# Patient Record
Sex: FEMALE | Race: BLACK OR AFRICAN AMERICAN
[De-identification: names, ages, dates, MRNs, and addresses within clinical notes are randomized per-mention and may not be internally consistent; named-entity substitution may affect disease eponyms.]

---

## 2019-02-24 ENCOUNTER — HOSPITAL ENCOUNTER (EMERGENCY)
Dept: HOSPITAL 61 - ER | Age: 39
Discharge: HOME | End: 2019-02-24
Payer: COMMERCIAL

## 2019-02-24 VITALS — HEIGHT: 61 IN | WEIGHT: 232.25 LBS | BODY MASS INDEX: 43.85 KG/M2

## 2019-02-24 VITALS — DIASTOLIC BLOOD PRESSURE: 67 MMHG | SYSTOLIC BLOOD PRESSURE: 146 MMHG

## 2019-02-24 DIAGNOSIS — Z88.2: ICD-10-CM

## 2019-02-24 DIAGNOSIS — L30.9: Primary | ICD-10-CM

## 2019-02-24 DIAGNOSIS — Z88.0: ICD-10-CM

## 2019-02-24 PROCEDURE — 99283 EMERGENCY DEPT VISIT LOW MDM: CPT

## 2019-02-24 NOTE — PHYS DOC
Past Medical History


Past Medical History:  Other


Additional Past Medical Histor:  tension headache


Past Surgical History:  Cholecystectomy, Tubal ligation


Alcohol Use:  Occasionally


Drug Use:  None





Adult General


Chief Complaint


Chief Complaint:  SKIN RASH/ABSCESS





[facility], [place]





Patient is a 39  year old female who presents with 2 weeks ago she began having 

patches of red itchy areas on her left arm.





Review of Systems


Review of Systems





Constitutional: Denies fever or chills []


Eyes: Denies change in visual acuity, redness, or eye pain []


HENT: Denies nasal congestion or sore throat []


Respiratory: Denies cough or shortness of breath []


Cardiovascular: No additional information not addressed in HPI []


GI: Denies abdominal pain, nausea, vomiting, bloody stools or diarrhea []


: Denies dysuria or hematuria []


Musculoskeletal: Denies back pain or joint pain []


Integument: Left arm rash or skin lesions []


Neurologic: Denies headache, focal weakness or sensory changes []





All other systems were reviewed and found to be within normal limits, except as 

documented in this note.





Allergies


Allergies





Allergies








Coded Allergies Type Severity Reaction Last Updated Verified


 


  Penicillins Allergy Intermediate hives, itching 2/24/19 Yes


 


  Sulfa (Sulfonamide Antibiotics) Allergy Unknown  4/18/14 Yes











Physical Exam


Physical Exam





Constitutional: Well developed, well nourished, no acute distress, non-toxic 

appearance. []


HENT: Normocephalic, atraumatic, bilateral external ears normal, oropharynx 

moist, no oral exudates, nose normal. []


Eyes: PERRLA, EOMI, conjunctiva normal, no discharge. [] 


Neck: Normal range of motion, no tenderness, supple, no stridor. [] 


Cardiovascular:Heart rate regular rhythm, no murmur []


Lungs & Thorax:  Bilateral breath sounds clear to auscultation []


Abdomen: Bowel sounds normal, soft, no tenderness, no masses, no pulsatile 

masses. [] 


Skin: Warm, dry, left arm erythema, left arm rash. [] 


Back: No tenderness, no CVA tenderness. [] 


Extremities: No tenderness, no cyanosis, no clubbing, ROM intact, no edema. [] 


Neurologic: Alert and oriented X 3, normal motor function, normal sensory 

function, no focal deficits noted. []


Psychologic: Affect normal, judgement normal, mood normal. []





Current Patient Data


Vital Signs





 Vital Signs








  Date Time  Temp Pulse Resp B/P (MAP) Pulse Ox O2 Delivery O2 Flow Rate FiO2


 


2/24/19 11:52 98.3 89 18 146/67 (93) 100 Room Air  





 98.3       











EKG


EKG


[]





Radiology/Procedures


Radiology/Procedures


[]





Course & Med Decision Making


Course & Med Decision Making


Patient is a 39  year old female who presents with 2 weeks ago she began having 

patches of red itchy areas on her left arm. Patient has a raised red dry 

excoriated-looking area to left lower forearm and right upper arm. Patient has 

is nowhere else on her body. Patient likely has atypical otitis. Patient is 

given Triamcinolone cream and is to take benadryl. Patient follow-up with her 

primary care provider. Alert and oriented. Denies SOA or chest pain.





Dragon Disclaimer


Dragon Disclaimer


This electronic medical record was generated, in whole or in part, using a 

voice recognition dictation system.





Departure


Departure


Impression:  


 Primary Impression:  


 Dermatitis


Disposition:  01 HOME, SELF-CARE


Condition:  STABLE


Referrals:  


NO PCP (PCP)


Patient Instructions:  Contact Dermatitis, Eczema





Additional Instructions:  


Follow-up with primary care provider. Use medications as prescribed. Taking 

Benadryl for the itching.


Scripts


Cephalexin (KEFLEX) 500 Mg Capsule


1 CAP PO TID, #21 CAP


   Prov: MIGDALIA BRICENO         2/24/19 


Triamcinolone Acetonide (TRIAMCINOLONE ACETONIDE 0.1% OINT) 15 Gm Oint...g.


1 NATALYA TP TID for WOUND CARE, #1 TUBE


   MIX WITH EUCERIN


   AS DIRECTED BY PHYSICIAN


   Prov: MIGDALIA BRICENO         2/24/19











MIGDALIA BRICENO Feb 24, 2019 12:33

## 2019-09-29 ENCOUNTER — HOSPITAL ENCOUNTER (INPATIENT)
Dept: HOSPITAL 61 - ER | Age: 39
LOS: 3 days | Discharge: HOME | DRG: 580 | End: 2019-10-02
Attending: INTERNAL MEDICINE | Admitting: INTERNAL MEDICINE
Payer: COMMERCIAL

## 2019-09-29 VITALS — DIASTOLIC BLOOD PRESSURE: 71 MMHG | SYSTOLIC BLOOD PRESSURE: 145 MMHG

## 2019-09-29 VITALS — WEIGHT: 233 LBS | BODY MASS INDEX: 43.99 KG/M2 | HEIGHT: 61 IN

## 2019-09-29 VITALS — DIASTOLIC BLOOD PRESSURE: 65 MMHG | SYSTOLIC BLOOD PRESSURE: 132 MMHG

## 2019-09-29 DIAGNOSIS — R65.10: ICD-10-CM

## 2019-09-29 DIAGNOSIS — T63.301A: ICD-10-CM

## 2019-09-29 DIAGNOSIS — E87.6: ICD-10-CM

## 2019-09-29 DIAGNOSIS — L30.9: ICD-10-CM
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
## 2019-09-29 DIAGNOSIS — E66.01: ICD-10-CM

## 2019-09-29 DIAGNOSIS — Z88.2: ICD-10-CM

## 2019-09-29 DIAGNOSIS — L03.115: ICD-10-CM
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
## 2019-09-29 DIAGNOSIS — L02.415: Primary | ICD-10-CM

## 2019-09-29 LAB
ALBUMIN SERPL-MCNC: 3.6 G/DL (ref 3.4–5)
ALBUMIN/GLOB SERPL: 0.8 {RATIO} (ref 1–1.7)
ALP SERPL-CCNC: 73 U/L (ref 46–116)
ALT SERPL-CCNC: 14 U/L (ref 14–59)
ANION GAP SERPL CALC-SCNC: 9 MMOL/L (ref 6–14)
AST SERPL-CCNC: 13 U/L (ref 15–37)
BASOPHILS # BLD AUTO: 0 X10^3/UL (ref 0–0.2)
BASOPHILS NFR BLD: 0 % (ref 0–3)
BILIRUB SERPL-MCNC: 0.4 MG/DL (ref 0.2–1)
BUN SERPL-MCNC: 6 MG/DL (ref 7–20)
BUN/CREAT SERPL: 7 (ref 6–20)
CALCIUM SERPL-MCNC: 9.6 MG/DL (ref 8.5–10.1)
CHLORIDE SERPL-SCNC: 101 MMOL/L (ref 98–107)
CO2 SERPL-SCNC: 29 MMOL/L (ref 21–32)
CREAT SERPL-MCNC: 0.9 MG/DL (ref 0.6–1)
EOSINOPHIL NFR BLD: 0.1 X10^3/UL (ref 0–0.7)
EOSINOPHIL NFR BLD: 1 % (ref 0–3)
ERYTHROCYTE [DISTWIDTH] IN BLOOD BY AUTOMATED COUNT: 13.5 % (ref 11.5–14.5)
GFR SERPLBLD BASED ON 1.73 SQ M-ARVRAT: 83.5 ML/MIN
GLOBULIN SER-MCNC: 4.8 G/DL (ref 2.2–3.8)
GLUCOSE SERPL-MCNC: 99 MG/DL (ref 70–99)
HCT VFR BLD CALC: 34.6 % (ref 36–47)
HGB BLD-MCNC: 12 G/DL (ref 12–15.5)
LYMPHOCYTES # BLD: 2.1 X10^3/UL (ref 1–4.8)
LYMPHOCYTES NFR BLD AUTO: 21 % (ref 24–48)
MCH RBC QN AUTO: 31 PG (ref 25–35)
MCHC RBC AUTO-ENTMCNC: 35 G/DL (ref 31–37)
MCV RBC AUTO: 90 FL (ref 79–100)
MONO #: 0.5 X10^3/UL (ref 0–1.1)
MONOCYTES NFR BLD: 5 % (ref 0–9)
NEUT #: 7.3 X10^3/UL (ref 1.8–7.7)
NEUTROPHILS NFR BLD AUTO: 73 % (ref 31–73)
PLATELET # BLD AUTO: 263 X10^3/UL (ref 140–400)
POTASSIUM SERPL-SCNC: 3.3 MMOL/L (ref 3.5–5.1)
PROT SERPL-MCNC: 8.4 G/DL (ref 6.4–8.2)
RBC # BLD AUTO: 3.86 X10^6/UL (ref 3.5–5.4)
SODIUM SERPL-SCNC: 139 MMOL/L (ref 136–145)
WBC # BLD AUTO: 10 X10^3/UL (ref 4–11)

## 2019-09-29 PROCEDURE — 83605 ASSAY OF LACTIC ACID: CPT

## 2019-09-29 PROCEDURE — 87040 BLOOD CULTURE FOR BACTERIA: CPT

## 2019-09-29 PROCEDURE — A7015 AEROSOL MASK USED W NEBULIZE: HCPCS

## 2019-09-29 PROCEDURE — A4461 SURGICL DRESS HOLD NON-REUSE: HCPCS

## 2019-09-29 PROCEDURE — 90471 IMMUNIZATION ADMIN: CPT

## 2019-09-29 PROCEDURE — 36415 COLL VENOUS BLD VENIPUNCTURE: CPT

## 2019-09-29 PROCEDURE — 87075 CULTR BACTERIA EXCEPT BLOOD: CPT

## 2019-09-29 PROCEDURE — 85025 COMPLETE CBC W/AUTO DIFF WBC: CPT

## 2019-09-29 PROCEDURE — 80053 COMPREHEN METABOLIC PANEL: CPT

## 2019-09-29 PROCEDURE — 73590 X-RAY EXAM OF LOWER LEG: CPT

## 2019-09-29 PROCEDURE — 90714 TD VACC NO PRESV 7 YRS+ IM: CPT

## 2019-09-29 PROCEDURE — 81025 URINE PREGNANCY TEST: CPT

## 2019-09-29 PROCEDURE — 80048 BASIC METABOLIC PNL TOTAL CA: CPT

## 2019-09-29 PROCEDURE — 96365 THER/PROPH/DIAG IV INF INIT: CPT

## 2019-09-29 PROCEDURE — G0378 HOSPITAL OBSERVATION PER HR: HCPCS

## 2019-09-29 PROCEDURE — 87071 CULTURE AEROBIC QUANT OTHER: CPT

## 2019-09-29 PROCEDURE — 76881 US COMPL JOINT R-T W/IMG: CPT

## 2019-09-29 PROCEDURE — 85651 RBC SED RATE NONAUTOMATED: CPT

## 2019-09-29 RX ADMIN — ACETAMINOPHEN PRN MG: 500 TABLET ORAL at 20:12

## 2019-09-29 NOTE — PHYS DOC
Past Medical History


Past Medical History:  Other


Additional Past Medical Histor:  tension headache


Past Surgical History:  Cholecystectomy, Tubal ligation


Alcohol Use:  Occasionally


Drug Use:  None





Adult General


Chief Complaint


Chief Complaint:  INSECT BITE





HPI


HPI





Patient is a 39  year old Female who presents with received an insect bite on 

her right shin about a week ago and went to the doctor on Friday and they gave 

her Keflex. Patient states that the redness is getting worse around the bite and

the bite itself is open and draining clear liquid and has gotten bigger in size.

Patient denies fevers. She rates her pain an 8 out of 10.





Review of Systems


Review of Systems








Integument: Insect bite with cellulitis. Denies rash or skin lesions []





All other systems were reviewed and found to be within normal limits, except as 

documented in this note.





Allergies


Allergies





Allergies








Coded Allergies Type Severity Reaction Last Updated Verified


 


  Penicillins Allergy Intermediate hives, itching 2/24/19 Yes


 


  Sulfa (Sulfonamide Antibiotics) Allergy Unknown  4/18/14 Yes











Physical Exam


Physical Exam





Constitutional: Well developed, well nourished, no acute distress, non-toxic 

appearance. []


Skin: Warm, dry, Right shin erythema, no rash. [] 


Extremities: Right shin tenderness, no cyanosis, no clubbing, ROM intact, 1-2+ 

edema. [] 


Neurologic: Alert and oriented X 3, normal motor function, normal sensory 

function, no focal deficits noted. []


Psychologic: Affect normal, judgement normal, mood normal. []





Current Patient Data


Vital Signs





                                   Vital Signs








  Date Time  Temp Pulse Resp B/P (MAP) Pulse Ox O2 Delivery O2 Flow Rate FiO2


 


9/29/19 15:55 98.5 90 14 138/66 (90) 99 Room Air  





 98.5       








Lab Values





                                Laboratory Tests








Test


 9/29/19


17:00


 


White Blood Count


 10.0 x10^3/uL


(4.0-11.0)


 


Red Blood Count


 3.86 x10^6/uL


(3.50-5.40)


 


Hemoglobin


 12.0 g/dL


(12.0-15.5)


 


Hematocrit


 34.6 %


(36.0-47.0)  L


 


Mean Corpuscular Volume


 90 fL ()





 


Mean Corpuscular Hemoglobin 31 pg (25-35)  


 


Mean Corpuscular Hemoglobin


Concent 35 g/dL


(31-37)


 


Red Cell Distribution Width


 13.5 %


(11.5-14.5)


 


Platelet Count


 263 x10^3/uL


(140-400)


 


Neutrophils (%) (Auto) 73 % (31-73)  


 


Lymphocytes (%) (Auto) 21 % (24-48)  L


 


Monocytes (%) (Auto) 5 % (0-9)  


 


Eosinophils (%) (Auto) 1 % (0-3)  


 


Basophils (%) (Auto) 0 % (0-3)  


 


Neutrophils # (Auto)


 7.3 x10^3/uL


(1.8-7.7)


 


Lymphocytes # (Auto)


 2.1 x10^3/uL


(1.0-4.8)


 


Monocytes # (Auto)


 0.5 x10^3/uL


(0.0-1.1)


 


Eosinophils # (Auto)


 0.1 x10^3/uL


(0.0-0.7)


 


Basophils # (Auto)


 0.0 x10^3/uL


(0.0-0.2)


 


Sodium Level


 139 mmol/L


(136-145)


 


Potassium Level


 3.3 mmol/L


(3.5-5.1)  L


 


Chloride Level


 101 mmol/L


()


 


Carbon Dioxide Level


 29 mmol/L


(21-32)


 


Anion Gap 9 (6-14)  


 


Blood Urea Nitrogen


 6 mg/dL (7-20)


L


 


Creatinine


 0.9 mg/dL


(0.6-1.0)


 


Estimated GFR


(Cockcroft-Gault) 83.5  





 


BUN/Creatinine Ratio 7 (6-20)  


 


Glucose Level


 99 mg/dL


(70-99)


 


Lactic Acid Level


 0.9 mmol/L


(0.4-2.0)


 


Calcium Level


 9.6 mg/dL


(8.5-10.1)


 


Total Bilirubin


 0.4 mg/dL


(0.2-1.0)


 


Aspartate Amino Transferase


(AST) 13 U/L (15-37)


L


 


Alanine Aminotransferase (ALT)


 14 U/L (14-59)





 


Alkaline Phosphatase


 73 U/L


()


 


Total Protein


 8.4 g/dL


(6.4-8.2)  H


 


Albumin


 3.6 g/dL


(3.4-5.0)


 


Albumin/Globulin Ratio


 0.8 (1.0-1.7)


L





                                Laboratory Tests


9/29/19 17:00








                                Laboratory Tests


9/29/19 17:00











EKG


EKG


[]





Radiology/Procedures


Radiology/Procedures


[]





Course & Med Decision Making


Course & Med Decision Making


Patient is a 39  year old Female who presents with received an insect bite on 

her right shin about a week ago and went to the doctor on Friday and they gave 

her Keflex. Patient states that the redness is getting worse around the bite and

 the bite itself is open and draining clear liquid and has gotten bigger in 

size. Patient denies fevers. She rates her pain an 8 out of 10. Alert and 

oriented. Speaks in full clear sentences. Skin pink warm and dry. Patient has a 

right lower shin bite that is quarter sized, elevated, open and white with large

 amount of cellulitis and heat around the bite itself. The whole area itself is 

softball size or slightly larger. Tender to touch. Patient is ambulatory with a 

steady gait.





Blood work is unremarkable. I have talked to Dr. Starr for admission. I asked 

about antibiotics and she stated she would start them as she was already in the 

chart.





Dragon Disclaimer


Dragon Disclaimer


This electronic medical record was generated, in whole or in part, using a voice

 recognition dictation system.





Departure


Departure


Impression:  


   Primary Impression:  


   Cellulitis


Disposition:  09 ADMITTED AS INPATIENT


Admitting Physician:  HIMS


Condition:  STABLE


Referrals:  


NO PCP (PCP)





Problem Qualifiers








   Primary Impression:  


   Cellulitis


   Site of cellulitis:  extremity  Site of cellulitis of extremity:  lower 

   extremity  Laterality:  right  Qualified Codes:  L03.115 - Cellulitis of 

   right lower limb








MIGDALIA BRICENO            Sep 29, 2019 16:35

## 2019-09-29 NOTE — RAD
TIBIA FIBULA RIGHT

 

History: Wound

 

Comparison: None.

 

Findings:

3 views of the right tibia-fibula are submitted. No acute fracture or 

aggressive bone destruction is identified.

 

Impression: 

 

1.  No acute fracture or aggressive bone destruction is identified.

 

Electronically signed by: Jefry Babcock MD (9/29/2019 6:38 PM) [facility]

## 2019-09-30 VITALS — DIASTOLIC BLOOD PRESSURE: 63 MMHG | SYSTOLIC BLOOD PRESSURE: 114 MMHG

## 2019-09-30 VITALS — DIASTOLIC BLOOD PRESSURE: 63 MMHG | SYSTOLIC BLOOD PRESSURE: 122 MMHG

## 2019-09-30 VITALS — DIASTOLIC BLOOD PRESSURE: 69 MMHG | SYSTOLIC BLOOD PRESSURE: 125 MMHG

## 2019-09-30 VITALS — SYSTOLIC BLOOD PRESSURE: 131 MMHG | DIASTOLIC BLOOD PRESSURE: 68 MMHG

## 2019-09-30 VITALS — DIASTOLIC BLOOD PRESSURE: 60 MMHG | SYSTOLIC BLOOD PRESSURE: 112 MMHG

## 2019-09-30 VITALS — SYSTOLIC BLOOD PRESSURE: 129 MMHG | DIASTOLIC BLOOD PRESSURE: 69 MMHG

## 2019-09-30 VITALS — DIASTOLIC BLOOD PRESSURE: 61 MMHG | SYSTOLIC BLOOD PRESSURE: 109 MMHG

## 2019-09-30 VITALS — DIASTOLIC BLOOD PRESSURE: 54 MMHG | SYSTOLIC BLOOD PRESSURE: 108 MMHG

## 2019-09-30 VITALS — DIASTOLIC BLOOD PRESSURE: 71 MMHG | SYSTOLIC BLOOD PRESSURE: 131 MMHG

## 2019-09-30 VITALS — DIASTOLIC BLOOD PRESSURE: 68 MMHG | SYSTOLIC BLOOD PRESSURE: 125 MMHG

## 2019-09-30 VITALS — SYSTOLIC BLOOD PRESSURE: 142 MMHG | DIASTOLIC BLOOD PRESSURE: 73 MMHG

## 2019-09-30 LAB
ANION GAP SERPL CALC-SCNC: 9 MMOL/L (ref 6–14)
BUN SERPL-MCNC: 4 MG/DL (ref 7–20)
CALCIUM SERPL-MCNC: 9 MG/DL (ref 8.5–10.1)
CHLORIDE SERPL-SCNC: 104 MMOL/L (ref 98–107)
CO2 SERPL-SCNC: 28 MMOL/L (ref 21–32)
CREAT SERPL-MCNC: 0.7 MG/DL (ref 0.6–1)
GFR SERPLBLD BASED ON 1.73 SQ M-ARVRAT: 111.6 ML/MIN
GLUCOSE SERPL-MCNC: 100 MG/DL (ref 70–99)
POTASSIUM SERPL-SCNC: 4 MMOL/L (ref 3.5–5.1)
SODIUM SERPL-SCNC: 141 MMOL/L (ref 136–145)
U PREG PATIENT: NEGATIVE

## 2019-09-30 PROCEDURE — 0KDS0ZZ EXTRACTION OF RIGHT LOWER LEG MUSCLE, OPEN APPROACH: ICD-10-PCS | Performed by: ORTHOPAEDIC SURGERY

## 2019-09-30 RX ADMIN — ACETAMINOPHEN PRN MG: 500 TABLET ORAL at 20:35

## 2019-09-30 RX ADMIN — CLINDAMYCIN IN 5 PERCENT DEXTROSE SCH MLS/HR: 12 INJECTION, SOLUTION INTRAVENOUS at 06:15

## 2019-09-30 RX ADMIN — FENTANYL CITRATE PRN MCG: 50 INJECTION INTRAMUSCULAR; INTRAVENOUS at 12:38

## 2019-09-30 RX ADMIN — CLINDAMYCIN IN 5 PERCENT DEXTROSE SCH MLS/HR: 12 INJECTION, SOLUTION INTRAVENOUS at 21:54

## 2019-09-30 RX ADMIN — FENTANYL CITRATE PRN MCG: 50 INJECTION INTRAMUSCULAR; INTRAVENOUS at 12:22

## 2019-09-30 RX ADMIN — Medication SCH CAP: at 20:28

## 2019-09-30 RX ADMIN — HYDROCODONE BITARTRATE AND ACETAMINOPHEN PRN TAB: 5; 325 TABLET ORAL at 20:30

## 2019-09-30 RX ADMIN — CLINDAMYCIN IN 5 PERCENT DEXTROSE SCH MLS/HR: 12 INJECTION, SOLUTION INTRAVENOUS at 15:00

## 2019-09-30 RX ADMIN — HYDROCODONE BITARTRATE AND ACETAMINOPHEN PRN TAB: 5; 325 TABLET ORAL at 01:50

## 2019-09-30 NOTE — RAD
Examination: EXT NON VASC RIGHT

 

History: Spider bite. Swelling. Pus.

 

Comparison/Correlation: None

 

Findings: Ultrasound imaging of the right mid tibial level was performed.

 

There is a complex, heterogeneous collection measuring 2.1 cm x 1.4 cm 1.4

cm. Flow is evident within it. It is of irregular morphology and.

 

 

Impression:

Complex heterogeneous collection within the anterior right mid tibial 

level soft tissues which raises question of phlegmon. 

 

Electronically signed by: Ayaan Tellez MD (9/30/2019 8:00 AM) [facility]

## 2019-09-30 NOTE — PDOC2
CONSULT


Date of Consult


Date of Consult


DATE: 9/30/19 


TIME: 09:40





Reason for Consult


Reason for Consult:


Infected spider bite right lower leg.





Referring Physician


Referring Physician:


Dr Starr





Identification/Chief Complaint


Chief Complaint


pain,drainage, and swelling right lower anterior leg.





Source


Source:  Caregiver, Chart review, Patient





History of Present Illness


Reason for Visit:


Patient states she noticed spider bite approximately 6 days ago and became 

swollen and red and went to  physician and was given antibiotics.


Purulent drainage noted yesterday and was admitted for treatment at that time.





Past Medical History


Cardiovascular:  No pertinent hx


Pulmonary:  No pertinent hx


GI:  No pertinent hx, Other (history of gallbladder disease)


Heme/Onc:  No pertinent hx


Hepatobiliary:  No pertinent hx


Psych:  No pertinent hx


Rheumatologic:  No pertinent hx


Infectious disease:  No pertinent hx


ENT:  No pertinent hx


Renal/:  No pertinent hx





Past Surgical History


Past Surgical History:  Cholecystectomy, Tubal Ligation, No pertinent history





Family History


Family History:  No Significant





Social History


No


ALCOHOL:  occassional


Drugs:  None


Lives:  with Family





Current Problem List


Problem List


Problems


Medical Problems:


(1) Cellulitis


Status: Acute  











Current Medications


Current Medications





Current Medications


Potassium Chloride (Klor-Con) 40 meq 1X  ONCE PO  Last administered on 9/29/19at

18:19;  Start 9/29/19 at 18:00;  Stop 9/29/19 at 18:01;  Status DC


Acetaminophen/ Hydrocodone Bitart (Lortab 5/325) 1 tab PRN Q4HRS  PRN PO PAIN 

Last administered on 9/30/19at 01:50;  Start 9/29/19 at 18:00


Zolpidem Tartrate (Ambien) 5 mg PRN QHS  PRN PO INSOMNIA;  Start 9/29/19 at 

18:00


Fentanyl Citrate (Fentanyl 2ml Vial) 50 mcg PRN Q2HR  PRN IV PAIN;  Start 

9/29/19 at 18:00


Clindamycin Phosphate 50 ml @  100 mls/hr Q8HRS IV  Last administered on 

9/30/19at 06:15;  Start 9/30/19 at 06:00


Celecoxib (CeleBREX) 100 mg BID PO ;  Start 9/30/19 at 09:00;  Stop 9/29/19 at 

19:05;  Status DC


Triamcinolone Acetonide (Kenalog 0.1%) 1 roxy PRN TID  PRN TP INFLAMMED AREA;  

Start 9/29/19 at 21:00


Acetaminophen (Tylenol) 500 mg PRN Q6HRS  PRN PO HEADACHE/ TEMP Last 

administered on 9/29/19at 20:12;  Start 9/29/19 at 18:00


Ondansetron HCl (Zofran) 4 mg PRN Q6HRS  PRN IVP NAUSEA/VOMITING;  Start 9/29/19

at 18:00


Clindamycin Phosphate 50 ml @  100 mls/hr ONCE  ONCE IV  Last administered on 

9/29/19at 18:18;  Start 9/29/19 at 18:00;  Stop 9/29/19 at 18:29;  Status DC


Tetanus/ Diphtheria Toxoids (Tenivac Syringe) 0.5 ml ONCE ONCE VAX IM  Last 

administered on 9/29/19at 23:06;  Start 9/29/19 at 19:15;  Stop 9/29/19 at 

19:16;  Status DC





Active Scripts


Active


Keflex (Cephalexin) 500 Mg Capsule 1 Cap PO TID


Triamcinolone Acetonide 0.1% Oint (Triamcinolone Acetonide) 15 Gm Oint...g. 1 

Roxy TP TID


     MIX WITH EUCERIN


     AS DIRECTED BY PHYSICIAN


Doxycycline Hyclate 100 Mg Tablet 100 Mg PO BID 5 Days


Prednisone 20 Mg Tablet 20 Mg PO DAILY 5 Days





Allergies


Allergies:  


Coded Allergies:  


     Penicillins (Verified  Allergy, Intermediate, hives, itching, 2/24/19)


     Sulfa (Sulfonamide Antibiotics) (Verified  Allergy, Intermediate, 9/29/19)





Physical Exam


General:  Alert, Oriented X3, Cooperative, mild distress


Extremities:  Normal pulses


MUSCULOSKELETAL:  Abnormal exam of right (Right lower leg with swelling,reddenss

and purulent drainage from suspected spiider bite.)





Vitals


VITALS





Vital Signs








  Date Time  Temp Pulse Resp B/P (MAP) Pulse Ox O2 Delivery O2 Flow Rate FiO2


 


9/30/19 07:00 97.7 80 17 109/61 (77) 97 Room Air  





 97.7       











Labs


Labs





Laboratory Tests








Test


 9/29/19


17:00 9/30/19


05:30


 


White Blood Count


 10.0 x10^3/uL


(4.0-11.0) 





 


Red Blood Count


 3.86 x10^6/uL


(3.50-5.40) 





 


Hemoglobin


 12.0 g/dL


(12.0-15.5) 





 


Hematocrit


 34.6 %


(36.0-47.0) 





 


Mean Corpuscular Volume 90 fL ()  


 


Mean Corpuscular Hemoglobin 31 pg (25-35)  


 


Mean Corpuscular Hemoglobin


Concent 35 g/dL


(31-37) 





 


Red Cell Distribution Width


 13.5 %


(11.5-14.5) 





 


Platelet Count


 263 x10^3/uL


(140-400) 





 


Neutrophils (%) (Auto) 73 % (31-73)  


 


Lymphocytes (%) (Auto) 21 % (24-48)  


 


Monocytes (%) (Auto) 5 % (0-9)  


 


Eosinophils (%) (Auto) 1 % (0-3)  


 


Basophils (%) (Auto) 0 % (0-3)  


 


Neutrophils # (Auto)


 7.3 x10^3/uL


(1.8-7.7) 





 


Lymphocytes # (Auto)


 2.1 x10^3/uL


(1.0-4.8) 





 


Monocytes # (Auto)


 0.5 x10^3/uL


(0.0-1.1) 





 


Eosinophils # (Auto)


 0.1 x10^3/uL


(0.0-0.7) 





 


Basophils # (Auto)


 0.0 x10^3/uL


(0.0-0.2) 





 


Erythrocyte Sedimentation Rate 66 (0-25)  


 


Sodium Level


 139 mmol/L


(136-145) 141 mmol/L


(136-145)


 


Potassium Level


 3.3 mmol/L


(3.5-5.1) 4.0 mmol/L


(3.5-5.1)


 


Chloride Level


 101 mmol/L


() 104 mmol/L


()


 


Carbon Dioxide Level


 29 mmol/L


(21-32) 28 mmol/L


(21-32)


 


Anion Gap 9 (6-14)  9 (6-14) 


 


Blood Urea Nitrogen 6 mg/dL (7-20)  4 mg/dL (7-20) 


 


Creatinine


 0.9 mg/dL


(0.6-1.0) 0.7 mg/dL


(0.6-1.0)


 


Estimated GFR


(Cockcroft-Gault) 83.5 


 111.6 





 


BUN/Creatinine Ratio 7 (6-20)  


 


Glucose Level


 99 mg/dL


(70-99) 100 mg/dL


(70-99)


 


Lactic Acid Level


 0.9 mmol/L


(0.4-2.0) 





 


Calcium Level


 9.6 mg/dL


(8.5-10.1) 9.0 mg/dL


(8.5-10.1)


 


Total Bilirubin


 0.4 mg/dL


(0.2-1.0) 





 


Aspartate Amino Transf


(AST/SGOT) 13 U/L (15-37) 


 





 


Alanine Aminotransferase


(ALT/SGPT) 14 U/L (14-59) 


 





 


Alkaline Phosphatase


 73 U/L


() 





 


Total Protein


 8.4 g/dL


(6.4-8.2) 





 


Albumin


 3.6 g/dL


(3.4-5.0) 





 


Albumin/Globulin Ratio 0.8 (1.0-1.7)  








Laboratory Tests








Test


 9/29/19


17:00 9/30/19


05:30


 


White Blood Count


 10.0 x10^3/uL


(4.0-11.0) 





 


Red Blood Count


 3.86 x10^6/uL


(3.50-5.40) 





 


Hemoglobin


 12.0 g/dL


(12.0-15.5) 





 


Hematocrit


 34.6 %


(36.0-47.0) 





 


Mean Corpuscular Volume 90 fL ()  


 


Mean Corpuscular Hemoglobin 31 pg (25-35)  


 


Mean Corpuscular Hemoglobin


Concent 35 g/dL


(31-37) 





 


Red Cell Distribution Width


 13.5 %


(11.5-14.5) 





 


Platelet Count


 263 x10^3/uL


(140-400) 





 


Neutrophils (%) (Auto) 73 % (31-73)  


 


Lymphocytes (%) (Auto) 21 % (24-48)  


 


Monocytes (%) (Auto) 5 % (0-9)  


 


Eosinophils (%) (Auto) 1 % (0-3)  


 


Basophils (%) (Auto) 0 % (0-3)  


 


Neutrophils # (Auto)


 7.3 x10^3/uL


(1.8-7.7) 





 


Lymphocytes # (Auto)


 2.1 x10^3/uL


(1.0-4.8) 





 


Monocytes # (Auto)


 0.5 x10^3/uL


(0.0-1.1) 





 


Eosinophils # (Auto)


 0.1 x10^3/uL


(0.0-0.7) 





 


Basophils # (Auto)


 0.0 x10^3/uL


(0.0-0.2) 





 


Erythrocyte Sedimentation Rate 66 (0-25)  


 


Sodium Level


 139 mmol/L


(136-145) 141 mmol/L


(136-145)


 


Potassium Level


 3.3 mmol/L


(3.5-5.1) 4.0 mmol/L


(3.5-5.1)


 


Chloride Level


 101 mmol/L


() 104 mmol/L


()


 


Carbon Dioxide Level


 29 mmol/L


(21-32) 28 mmol/L


(21-32)


 


Anion Gap 9 (6-14)  9 (6-14) 


 


Blood Urea Nitrogen 6 mg/dL (7-20)  4 mg/dL (7-20) 


 


Creatinine


 0.9 mg/dL


(0.6-1.0) 0.7 mg/dL


(0.6-1.0)


 


Estimated GFR


(Cockcroft-Gault) 83.5 


 111.6 





 


BUN/Creatinine Ratio 7 (6-20)  


 


Glucose Level


 99 mg/dL


(70-99) 100 mg/dL


(70-99)


 


Lactic Acid Level


 0.9 mmol/L


(0.4-2.0) 





 


Calcium Level


 9.6 mg/dL


(8.5-10.1) 9.0 mg/dL


(8.5-10.1)


 


Total Bilirubin


 0.4 mg/dL


(0.2-1.0) 





 


Aspartate Amino Transf


(AST/SGOT) 13 U/L (15-37) 


 





 


Alanine Aminotransferase


(ALT/SGPT) 14 U/L (14-59) 


 





 


Alkaline Phosphatase


 73 U/L


() 





 


Total Protein


 8.4 g/dL


(6.4-8.2) 





 


Albumin


 3.6 g/dL


(3.4-5.0) 





 


Albumin/Globulin Ratio 0.8 (1.0-1.7)  











Assessment/Plan


Assessment/Plan


Patient with reported spider bite right anterior lower leg.


Wound with swelling, redness and, purulent drainage from small open wound.


Will need possible irrigation and debridement of area after wound increasing in 

size and drainage after antibiotics.


will discuss with staff for planning.











JUAN BUCHANAN            Sep 30, 2019 09:56

## 2019-09-30 NOTE — PDOC4
Operative Note


Operative Note


Date of surgery: 9/30/2019





Preoperative diagnosis: Abscess right leg





Postoperative diagnosis: Same with involvement skin and subcutaneous muscle 

fascia but not periosteum or bone





Operative procedure: Irrigation debridement right leg abscess





Surgeon: Alexy





Anesthesia: Gen.





Estimated blood loss: 10 mL





Cultures: Intraoperative aerobic and anaerobic Gram stain obtained from abscess





Complications: None





Operative indications: Please see dictated orthopedic consultation of today for 

detailed operative indications and note that I had discussed with the patient 

the abscess and surrounding that tissue would need to be cleaned out to get 

better blood supply to allow the body and antibiotics to fight off her current 

infection and she may need additional surgeries and longer term wound care 

likely expected packing to let the wound heal from the inside out all her 

questions were answered she wishes to proceed with surgical evaluation and 

treatment.





Operative text: Patient was identified procedure verified patient placed in the 

supine position on the operating table. After adequate amounts of general 

anesthesia were administered the right lower extremity was prepped and draped in

standard sterile fashion and after timeout was performed patient procedure 

identified and verified a longitudinal incision was made over the central aspect

of the abscess visible at the skin area cultures were obtained of the purulent 

fluid and thorough sharp debridement was carried out of the skin and 

subcutaneous tissue muscle fascia back to tissue with good blood supply 

periosteum and bone were not violated thorough irrigation was carried out with 1

L of normal saline solution with pulse lavage and the right leg wound was packed

with half-inch iodoform gauze sterile dressings were applied patient was 

returned to recovery room in stable condition having tolerated procedure well











NOEMÍ LOPEZ MD           Sep 30, 2019 12:08

## 2019-09-30 NOTE — PDOC
PROGRESS NOTES


Chief Complaint


Chief Complaint


Left shin abscess sparing the joint


Morbid obesity


Eczema - bilateral legs, feet, plantar and palmar surfaces





History of Present Illness


History of Present Illness


Ms Metcalf is a 38yo F w/ PMHx Obesity, eczema who was admitted for cellulitis

and abscess of right anterior tibia.


She feels she had a spider bite last week, went to ER, given keflex and pain med

but no better, NOW more red, swollen painful and possible fluctuance with 

induration around, NON DM,. no fevers, at home, WBC 10. PCN and sulfa gives her 

hives and breathing issues, respectively. Admitted for cellulitis with possible 

abscess that failed OP Abx





Tetanus shot unrecalled


NO home meds to reconcile


US right tibia - Complex heterogeneous collection within the anterior right mid 

tibial level soft tissues which raises question of phlegmon. 





She states her pain is reasonably controlled. There is a lot of purulent 

drainage from her leg and has some active eczema on both arms, palms, and feet. 

NPO to consider OR for I&D of leg today.





Vitals


Vitals





Vital Signs








  Date Time  Temp Pulse Resp B/P (MAP) Pulse Ox O2 Delivery O2 Flow Rate FiO2


 


9/30/19 07:00 97.7 80 17 109/61 (77) 97 Room Air  





 97.7       











Physical Exam


General:  Alert, Oriented X3, Cooperative, No acute distress


Abdomen:  Normal bowel sounds, Soft, No tenderness, No hepatosplenomegaly, No 

masses


Skin:  Other (redenss with possible small fluctuiance but definite induration 

around the bite site,warm, tender to touch)





Labs


LABS





Laboratory Tests








Test


 9/29/19


17:00 9/30/19


05:30


 


White Blood Count


 10.0 x10^3/uL


(4.0-11.0) 





 


Red Blood Count


 3.86 x10^6/uL


(3.50-5.40) 





 


Hemoglobin


 12.0 g/dL


(12.0-15.5) 





 


Hematocrit


 34.6 %


(36.0-47.0) 





 


Mean Corpuscular Volume 90 fL ()  


 


Mean Corpuscular Hemoglobin 31 pg (25-35)  


 


Mean Corpuscular Hemoglobin


Concent 35 g/dL


(31-37) 





 


Red Cell Distribution Width


 13.5 %


(11.5-14.5) 





 


Platelet Count


 263 x10^3/uL


(140-400) 





 


Neutrophils (%) (Auto) 73 % (31-73)  


 


Lymphocytes (%) (Auto) 21 % (24-48)  


 


Monocytes (%) (Auto) 5 % (0-9)  


 


Eosinophils (%) (Auto) 1 % (0-3)  


 


Basophils (%) (Auto) 0 % (0-3)  


 


Neutrophils # (Auto)


 7.3 x10^3/uL


(1.8-7.7) 





 


Lymphocytes # (Auto)


 2.1 x10^3/uL


(1.0-4.8) 





 


Monocytes # (Auto)


 0.5 x10^3/uL


(0.0-1.1) 





 


Eosinophils # (Auto)


 0.1 x10^3/uL


(0.0-0.7) 





 


Basophils # (Auto)


 0.0 x10^3/uL


(0.0-0.2) 





 


Erythrocyte Sedimentation Rate 66 (0-25)  


 


Sodium Level


 139 mmol/L


(136-145) 141 mmol/L


(136-145)


 


Potassium Level


 3.3 mmol/L


(3.5-5.1) 4.0 mmol/L


(3.5-5.1)


 


Chloride Level


 101 mmol/L


() 104 mmol/L


()


 


Carbon Dioxide Level


 29 mmol/L


(21-32) 28 mmol/L


(21-32)


 


Anion Gap 9 (6-14)  9 (6-14) 


 


Blood Urea Nitrogen 6 mg/dL (7-20)  4 mg/dL (7-20) 


 


Creatinine


 0.9 mg/dL


(0.6-1.0) 0.7 mg/dL


(0.6-1.0)


 


Estimated GFR


(Cockcroft-Gault) 83.5 


 111.6 





 


BUN/Creatinine Ratio 7 (6-20)  


 


Glucose Level


 99 mg/dL


(70-99) 100 mg/dL


(70-99)


 


Lactic Acid Level


 0.9 mmol/L


(0.4-2.0) 





 


Calcium Level


 9.6 mg/dL


(8.5-10.1) 9.0 mg/dL


(8.5-10.1)


 


Total Bilirubin


 0.4 mg/dL


(0.2-1.0) 





 


Aspartate Amino Transf


(AST/SGOT) 13 U/L (15-37) 


 





 


Alanine Aminotransferase


(ALT/SGPT) 14 U/L (14-59) 


 





 


Alkaline Phosphatase


 73 U/L


() 





 


Total Protein


 8.4 g/dL


(6.4-8.2) 





 


Albumin


 3.6 g/dL


(3.4-5.0) 





 


Albumin/Globulin Ratio 0.8 (1.0-1.7)  











Assessment and Plan


Assessmemt and Plan


Problems


Medical Problems:


(1) Cellulitis


Status: Acute  











Comment


Review of Relevant


I have reviewed the following items eleazar (where applicable) has been applied.


Labs





Laboratory Tests








Test


 9/29/19


17:00 9/30/19


05:30


 


White Blood Count


 10.0 x10^3/uL


(4.0-11.0) 





 


Red Blood Count


 3.86 x10^6/uL


(3.50-5.40) 





 


Hemoglobin


 12.0 g/dL


(12.0-15.5) 





 


Hematocrit


 34.6 %


(36.0-47.0) 





 


Mean Corpuscular Volume 90 fL ()  


 


Mean Corpuscular Hemoglobin 31 pg (25-35)  


 


Mean Corpuscular Hemoglobin


Concent 35 g/dL


(31-37) 





 


Red Cell Distribution Width


 13.5 %


(11.5-14.5) 





 


Platelet Count


 263 x10^3/uL


(140-400) 





 


Neutrophils (%) (Auto) 73 % (31-73)  


 


Lymphocytes (%) (Auto) 21 % (24-48)  


 


Monocytes (%) (Auto) 5 % (0-9)  


 


Eosinophils (%) (Auto) 1 % (0-3)  


 


Basophils (%) (Auto) 0 % (0-3)  


 


Neutrophils # (Auto)


 7.3 x10^3/uL


(1.8-7.7) 





 


Lymphocytes # (Auto)


 2.1 x10^3/uL


(1.0-4.8) 





 


Monocytes # (Auto)


 0.5 x10^3/uL


(0.0-1.1) 





 


Eosinophils # (Auto)


 0.1 x10^3/uL


(0.0-0.7) 





 


Basophils # (Auto)


 0.0 x10^3/uL


(0.0-0.2) 





 


Erythrocyte Sedimentation Rate 66 (0-25)  


 


Sodium Level


 139 mmol/L


(136-145) 141 mmol/L


(136-145)


 


Potassium Level


 3.3 mmol/L


(3.5-5.1) 4.0 mmol/L


(3.5-5.1)


 


Chloride Level


 101 mmol/L


() 104 mmol/L


()


 


Carbon Dioxide Level


 29 mmol/L


(21-32) 28 mmol/L


(21-32)


 


Anion Gap 9 (6-14)  9 (6-14) 


 


Blood Urea Nitrogen 6 mg/dL (7-20)  4 mg/dL (7-20) 


 


Creatinine


 0.9 mg/dL


(0.6-1.0) 0.7 mg/dL


(0.6-1.0)


 


Estimated GFR


(Cockcroft-Gault) 83.5 


 111.6 





 


BUN/Creatinine Ratio 7 (6-20)  


 


Glucose Level


 99 mg/dL


(70-99) 100 mg/dL


(70-99)


 


Lactic Acid Level


 0.9 mmol/L


(0.4-2.0) 





 


Calcium Level


 9.6 mg/dL


(8.5-10.1) 9.0 mg/dL


(8.5-10.1)


 


Total Bilirubin


 0.4 mg/dL


(0.2-1.0) 





 


Aspartate Amino Transf


(AST/SGOT) 13 U/L (15-37) 


 





 


Alanine Aminotransferase


(ALT/SGPT) 14 U/L (14-59) 


 





 


Alkaline Phosphatase


 73 U/L


() 





 


Total Protein


 8.4 g/dL


(6.4-8.2) 





 


Albumin


 3.6 g/dL


(3.4-5.0) 





 


Albumin/Globulin Ratio 0.8 (1.0-1.7)  








Laboratory Tests








Test


 9/29/19


17:00 9/30/19


05:30


 


White Blood Count


 10.0 x10^3/uL


(4.0-11.0) 





 


Red Blood Count


 3.86 x10^6/uL


(3.50-5.40) 





 


Hemoglobin


 12.0 g/dL


(12.0-15.5) 





 


Hematocrit


 34.6 %


(36.0-47.0) 





 


Mean Corpuscular Volume 90 fL ()  


 


Mean Corpuscular Hemoglobin 31 pg (25-35)  


 


Mean Corpuscular Hemoglobin


Concent 35 g/dL


(31-37) 





 


Red Cell Distribution Width


 13.5 %


(11.5-14.5) 





 


Platelet Count


 263 x10^3/uL


(140-400) 





 


Neutrophils (%) (Auto) 73 % (31-73)  


 


Lymphocytes (%) (Auto) 21 % (24-48)  


 


Monocytes (%) (Auto) 5 % (0-9)  


 


Eosinophils (%) (Auto) 1 % (0-3)  


 


Basophils (%) (Auto) 0 % (0-3)  


 


Neutrophils # (Auto)


 7.3 x10^3/uL


(1.8-7.7) 





 


Lymphocytes # (Auto)


 2.1 x10^3/uL


(1.0-4.8) 





 


Monocytes # (Auto)


 0.5 x10^3/uL


(0.0-1.1) 





 


Eosinophils # (Auto)


 0.1 x10^3/uL


(0.0-0.7) 





 


Basophils # (Auto)


 0.0 x10^3/uL


(0.0-0.2) 





 


Erythrocyte Sedimentation Rate 66 (0-25)  


 


Sodium Level


 139 mmol/L


(136-145) 141 mmol/L


(136-145)


 


Potassium Level


 3.3 mmol/L


(3.5-5.1) 4.0 mmol/L


(3.5-5.1)


 


Chloride Level


 101 mmol/L


() 104 mmol/L


()


 


Carbon Dioxide Level


 29 mmol/L


(21-32) 28 mmol/L


(21-32)


 


Anion Gap 9 (6-14)  9 (6-14) 


 


Blood Urea Nitrogen 6 mg/dL (7-20)  4 mg/dL (7-20) 


 


Creatinine


 0.9 mg/dL


(0.6-1.0) 0.7 mg/dL


(0.6-1.0)


 


Estimated GFR


(Cockcroft-Gault) 83.5 


 111.6 





 


BUN/Creatinine Ratio 7 (6-20)  


 


Glucose Level


 99 mg/dL


(70-99) 100 mg/dL


(70-99)


 


Lactic Acid Level


 0.9 mmol/L


(0.4-2.0) 





 


Calcium Level


 9.6 mg/dL


(8.5-10.1) 9.0 mg/dL


(8.5-10.1)


 


Total Bilirubin


 0.4 mg/dL


(0.2-1.0) 





 


Aspartate Amino Transf


(AST/SGOT) 13 U/L (15-37) 


 





 


Alanine Aminotransferase


(ALT/SGPT) 14 U/L (14-59) 


 





 


Alkaline Phosphatase


 73 U/L


() 





 


Total Protein


 8.4 g/dL


(6.4-8.2) 





 


Albumin


 3.6 g/dL


(3.4-5.0) 





 


Albumin/Globulin Ratio 0.8 (1.0-1.7)  








Medications





Current Medications


Potassium Chloride (Klor-Con) 40 meq 1X  ONCE PO  Last administered on 9/29/19at

18:19;  Start 9/29/19 at 18:00;  Stop 9/29/19 at 18:01;  Status DC


Acetaminophen/ Hydrocodone Bitart (Lortab 5/325) 1 tab PRN Q4HRS  PRN PO PAIN 

Last administered on 9/30/19at 01:50;  Start 9/29/19 at 18:00


Zolpidem Tartrate (Ambien) 5 mg PRN QHS  PRN PO INSOMNIA;  Start 9/29/19 at 

18:00


Fentanyl Citrate (Fentanyl 2ml Vial) 50 mcg PRN Q2HR  PRN IV PAIN;  Start 

9/29/19 at 18:00


Clindamycin Phosphate 50 ml @  100 mls/hr Q8HRS IV  Last administered on 

9/30/19at 06:15;  Start 9/30/19 at 06:00


Celecoxib (CeleBREX) 100 mg BID PO ;  Start 9/30/19 at 09:00;  Stop 9/29/19 at 

19:05;  Status DC


Triamcinolone Acetonide (Kenalog 0.1%) 1 roxy PRN TID  PRN TP INFLAMMED AREA;  

Start 9/29/19 at 21:00


Acetaminophen (Tylenol) 500 mg PRN Q6HRS  PRN PO HEADACHE/ TEMP Last 

administered on 9/29/19at 20:12;  Start 9/29/19 at 18:00


Ondansetron HCl (Zofran) 4 mg PRN Q6HRS  PRN IVP NAUSEA/VOMITING;  Start 9/29/19

at 18:00


Clindamycin Phosphate 50 ml @  100 mls/hr ONCE  ONCE IV  Last administered on 

9/29/19at 18:18;  Start 9/29/19 at 18:00;  Stop 9/29/19 at 18:29;  Status DC


Tetanus/ Diphtheria Toxoids (Tenivac Syringe) 0.5 ml ONCE ONCE VAX IM  Last 

administered on 9/29/19at 23:06;  Start 9/29/19 at 19:15;  Stop 9/29/19 at 

19:16;  Status DC





Active Scripts


Active


Keflex (Cephalexin) 500 Mg Capsule 1 Cap PO TID


Triamcinolone Acetonide 0.1% Oint (Triamcinolone Acetonide) 15 Gm Oint...g. 1 

Roxy TP TID


     MIX WITH EUCERIN


     AS DIRECTED BY PHYSICIAN


Doxycycline Hyclate 100 Mg Tablet 100 Mg PO BID 5 Days


Prednisone 20 Mg Tablet 20 Mg PO DAILY 5 Days


Vitals/I & O





Vital Sign - Last 24 Hours








 9/29/19 9/29/19 9/29/19 9/29/19





 15:55 17:22 17:37 17:52


 


Temp 98.5   





 98.5   


 


Pulse 90 84 84 78


 


Resp 14 17 17 16


 


B/P (MAP) 138/66 (90) 138/57 (84) 147/80 (102) 144/70 (94)


 


Pulse Ox 99 99 96 99


 


O2 Delivery Room Air Room Air Room Air Room Air


 


    





    





 9/29/19 9/29/19 9/29/19 9/29/19





 18:07 18:22 18:37 19:30


 


Temp    98.4





    98.4


 


Pulse 76 87 82 86


 


Resp 17 16 17 16


 


B/P (MAP) 131/60 (83) 144/75 (98) 151/81 (104) 145/71 (95)


 


Pulse Ox 96 96 99 97


 


O2 Delivery Room Air Room Air Room Air Room Air


 


    





    





 9/29/19 9/29/19 9/30/19 9/30/19





 19:35 23:00 02:53 07:00


 


Temp  98.2 98.1 97.7





  98.2 98.1 97.7


 


Pulse 78 80 80 80


 


Resp 17 16 16 17


 


B/P (MAP) 152/77 (102) 132/65 (87) 112/60 (77) 109/61 (77)


 


Pulse Ox 97 96 96 97


 


O2 Delivery Room Air Room Air Room Air Room Air














Intake and Output   


 


 9/29/19 9/29/19 9/30/19





 14:59 22:59 06:59


 


Intake Total  500 ml 


 


Balance  500 ml 

















JIMMY NOVA MD        Sep 30, 2019 08:42

## 2019-10-01 VITALS — SYSTOLIC BLOOD PRESSURE: 102 MMHG | DIASTOLIC BLOOD PRESSURE: 45 MMHG

## 2019-10-01 VITALS — SYSTOLIC BLOOD PRESSURE: 107 MMHG | DIASTOLIC BLOOD PRESSURE: 53 MMHG

## 2019-10-01 VITALS — SYSTOLIC BLOOD PRESSURE: 112 MMHG | DIASTOLIC BLOOD PRESSURE: 68 MMHG

## 2019-10-01 VITALS — DIASTOLIC BLOOD PRESSURE: 62 MMHG | SYSTOLIC BLOOD PRESSURE: 101 MMHG

## 2019-10-01 VITALS — SYSTOLIC BLOOD PRESSURE: 104 MMHG | DIASTOLIC BLOOD PRESSURE: 46 MMHG

## 2019-10-01 VITALS — SYSTOLIC BLOOD PRESSURE: 121 MMHG | DIASTOLIC BLOOD PRESSURE: 44 MMHG

## 2019-10-01 RX ADMIN — ACETAMINOPHEN PRN MG: 500 TABLET ORAL at 12:22

## 2019-10-01 RX ADMIN — Medication SCH CAP: at 09:00

## 2019-10-01 RX ADMIN — CLINDAMYCIN IN 5 PERCENT DEXTROSE SCH MLS/HR: 12 INJECTION, SOLUTION INTRAVENOUS at 14:50

## 2019-10-01 RX ADMIN — HYDROCODONE BITARTRATE AND ACETAMINOPHEN PRN TAB: 5; 325 TABLET ORAL at 01:26

## 2019-10-01 RX ADMIN — CLINDAMYCIN IN 5 PERCENT DEXTROSE SCH MLS/HR: 12 INJECTION, SOLUTION INTRAVENOUS at 06:24

## 2019-10-01 RX ADMIN — Medication SCH CAP: at 21:21

## 2019-10-01 RX ADMIN — CLINDAMYCIN IN 5 PERCENT DEXTROSE SCH MLS/HR: 12 INJECTION, SOLUTION INTRAVENOUS at 21:20

## 2019-10-01 NOTE — PDOC
PROGRESS NOTES


Chief Complaint


Chief Complaint


Left shin abscess sparing the joint


Morbid obesity


Eczema - bilateral legs, feet, plantar and palmar surfaces





History of Present Illness


History of Present Illness


Ms Metcalf is a 38yo F w/ PMHx Obesity, eczema who was admitted for cellulitis

and abscess of right anterior tibia.


She feels she had a spider bite last week, went to ER, given keflex and pain med

but no better, NOW more red, swollen painful and possible fluctuance with 

induration around, NON DM,. no fevers, at home, WBC 10. PCN and sulfa gives her 

hives and breathing issues, respectively. Admitted for cellulitis with possible 

abscess that failed OP Abx. Tetanus shot unrecalled. US right tibia - Complex 

heterogeneous collection within the anterior right mid tibial level soft tissues

which raises question of phlegmon. 





9/30/19: To OR for I&D with purulent fluid removed. Wound open





She states her pain is reasonably controlled. Improved after surgery yesterday. 

She had BM, passing flatus. Good appetite. No SOB or CP. Awaiting cultures





Vitals


Vitals





Vital Signs








  Date Time  Temp Pulse Resp B/P (MAP) Pulse Ox O2 Delivery O2 Flow Rate FiO2


 


10/1/19 07:00 97.4 80 18 112/68 (83) 99 Room Air  





 97.4       


 


9/30/19 12:08       8 











Physical Exam


General:  Alert, Oriented X3, Cooperative, mild distress


Heart:  Regular rate, Normal S1, Normal S2


Lungs:  Clear


Abdomen:  Normal bowel sounds, Soft, No tenderness, No hepatosplenomegaly, No 

masses


Extremities:  Normal pulses


Skin:  Other (redenss with possible small fluctuiance but definite induration 

around the bite site,warm, tender to touch)





Labs


LABS





Laboratory Tests








Test


 9/30/19


10:00


 


Urine Pregnancy Test Negative (NEG) 











Assessment and Plan


Assessmemt and Plan


Problems


Medical Problems:


(1) Cellulitis


Status: Acute  











Comment


Review of Relevant


I have reviewed the following items eleazar (where applicable) has been applied.


Labs





Laboratory Tests








Test


 9/29/19


17:00 9/30/19


05:30 9/30/19


10:00


 


White Blood Count


 10.0 x10^3/uL


(4.0-11.0) 


 





 


Red Blood Count


 3.86 x10^6/uL


(3.50-5.40) 


 





 


Hemoglobin


 12.0 g/dL


(12.0-15.5) 


 





 


Hematocrit


 34.6 %


(36.0-47.0) 


 





 


Mean Corpuscular Volume 90 fL ()   


 


Mean Corpuscular Hemoglobin 31 pg (25-35)   


 


Mean Corpuscular Hemoglobin


Concent 35 g/dL


(31-37) 


 





 


Red Cell Distribution Width


 13.5 %


(11.5-14.5) 


 





 


Platelet Count


 263 x10^3/uL


(140-400) 


 





 


Neutrophils (%) (Auto) 73 % (31-73)   


 


Lymphocytes (%) (Auto) 21 % (24-48)   


 


Monocytes (%) (Auto) 5 % (0-9)   


 


Eosinophils (%) (Auto) 1 % (0-3)   


 


Basophils (%) (Auto) 0 % (0-3)   


 


Neutrophils # (Auto)


 7.3 x10^3/uL


(1.8-7.7) 


 





 


Lymphocytes # (Auto)


 2.1 x10^3/uL


(1.0-4.8) 


 





 


Monocytes # (Auto)


 0.5 x10^3/uL


(0.0-1.1) 


 





 


Eosinophils # (Auto)


 0.1 x10^3/uL


(0.0-0.7) 


 





 


Basophils # (Auto)


 0.0 x10^3/uL


(0.0-0.2) 


 





 


Erythrocyte Sedimentation Rate 66 (0-25)   


 


Sodium Level


 139 mmol/L


(136-145) 141 mmol/L


(136-145) 





 


Potassium Level


 3.3 mmol/L


(3.5-5.1) 4.0 mmol/L


(3.5-5.1) 





 


Chloride Level


 101 mmol/L


() 104 mmol/L


() 





 


Carbon Dioxide Level


 29 mmol/L


(21-32) 28 mmol/L


(21-32) 





 


Anion Gap 9 (6-14)  9 (6-14)  


 


Blood Urea Nitrogen 6 mg/dL (7-20)  4 mg/dL (7-20)  


 


Creatinine


 0.9 mg/dL


(0.6-1.0) 0.7 mg/dL


(0.6-1.0) 





 


Estimated GFR


(Cockcroft-Gault) 83.5 


 111.6 


 





 


BUN/Creatinine Ratio 7 (6-20)   


 


Glucose Level


 99 mg/dL


(70-99) 100 mg/dL


(70-99) 





 


Lactic Acid Level


 0.9 mmol/L


(0.4-2.0) 


 





 


Calcium Level


 9.6 mg/dL


(8.5-10.1) 9.0 mg/dL


(8.5-10.1) 





 


Total Bilirubin


 0.4 mg/dL


(0.2-1.0) 


 





 


Aspartate Amino Transf


(AST/SGOT) 13 U/L (15-37) 


 


 





 


Alanine Aminotransferase


(ALT/SGPT) 14 U/L (14-59) 


 


 





 


Alkaline Phosphatase


 73 U/L


() 


 





 


Total Protein


 8.4 g/dL


(6.4-8.2) 


 





 


Albumin


 3.6 g/dL


(3.4-5.0) 


 





 


Albumin/Globulin Ratio 0.8 (1.0-1.7)   


 


Urine Pregnancy Test   Negative (NEG) 








Laboratory Tests








Test


 9/30/19


10:00


 


Urine Pregnancy Test Negative (NEG) 








Microbiology


9/29/19 Blood Culture - Preliminary, Resulted


          NO GROWTH AFTER 1 DAY


Medications





Current Medications


Potassium Chloride (Klor-Con) 40 meq 1X  ONCE PO  Last administered on 9/29/19at

18:19;  Start 9/29/19 at 18:00;  Stop 9/29/19 at 18:01;  Status DC


Acetaminophen/ Hydrocodone Bitart (Lortab 5/325) 1 tab PRN Q4HRS  PRN PO PAIN 

Last administered on 10/1/19at 01:26;  Start 9/29/19 at 18:00


Zolpidem Tartrate (Ambien) 5 mg PRN QHS  PRN PO INSOMNIA;  Start 9/29/19 at 

18:00


Fentanyl Citrate (Fentanyl 2ml Vial) 50 mcg PRN Q2HR  PRN IV PAIN Last 

administered on 9/30/19at 12:38;  Start 9/29/19 at 18:00


Clindamycin Phosphate 50 ml @  100 mls/hr Q8HRS IV  Last administered on 

10/1/19at 06:24;  Start 9/30/19 at 06:00


Celecoxib (CeleBREX) 100 mg BID PO ;  Start 9/30/19 at 09:00;  Stop 9/29/19 at 

19:05;  Status DC


Triamcinolone Acetonide (Kenalog 0.1%) 1 roxy PRN TID  PRN TP INFLAMMED AREA;  

Start 9/29/19 at 21:00


Acetaminophen (Tylenol) 500 mg PRN Q6HRS  PRN PO HEADACHE/ TEMP Last administere

d on 9/30/19at 20:35;  Start 9/29/19 at 18:00


Ondansetron HCl (Zofran) 4 mg PRN Q6HRS  PRN IVP NAUSEA/VOMITING;  Start 9/29/19

at 18:00


Clindamycin Phosphate 50 ml @  100 mls/hr ONCE  ONCE IV  Last administered on 

9/29/19at 18:18;  Start 9/29/19 at 18:00;  Stop 9/29/19 at 18:29;  Status DC


Tetanus/ Diphtheria Toxoids (Tenivac Syringe) 0.5 ml ONCE ONCE VAX IM  Last 

administered on 9/29/19at 23:06;  Start 9/29/19 at 19:15;  Stop 9/29/19 at 

19:16;  Status DC


Ondansetron HCl (Zofran) 4 mg PRN Q6HRS  PRN IV NAUSEA/VOMITING;  Start 9/30/19 

at 09:45;  Stop 10/1/19 at 09:44


Fentanyl Citrate (Fentanyl 2ml Vial) 25 mcg PRN Q5MIN  PRN IV MILD PAIN 1-3;  

Start 9/30/19 at 09:45;  Stop 10/1/19 at 09:44


Fentanyl Citrate (Fentanyl 2ml Vial) 50 mcg PRN Q5MIN  PRN IV MODERATE TO SEVERE

PAIN;  Start 9/30/19 at 09:45;  Stop 10/1/19 at 09:44


Morphine Sulfate (Morphine Sulfate) 1 mg PRN Q10MIN  PRN IV SEVERE PAIN 7-10;  

Start 9/30/19 at 09:45;  Stop 10/1/19 at 09:44


Ringer's Solution 1,000 ml @  30 mls/hr Q24H IV  Last administered on 9/30/19at 

10:46;  Start 9/30/19 at 09:33;  Stop 9/30/19 at 21:32;  Status DC


Lidocaine HCl (Xylocaine-Mpf 1% 2ml Vial) 2 ml PRN 1X  PRN ID PRIOR TO IV START;

 Start 9/30/19 at 09:45;  Stop 10/1/19 at 09:44


Hydromorphone HCl (Dilaudid) 0.5 mg PRN Q10MIN  PRN IV SEV PAIN, Second choice; 

Start 9/30/19 at 09:45;  Stop 10/1/19 at 09:44


Prochlorperazine Edisylate (Compazine) 5 mg PACU PRN  PRN IV NAUSEA, MRX1;  

Start 9/30/19 at 09:45;  Stop 10/1/19 at 09:44


Ondansetron HCl (Zofran) 4 mg STK-MED ONCE .ROUTE ;  Start 9/30/19 at 10:52;  

Stop 9/30/19 at 10:52;  Status DC


Propofol 20 ml @ As Directed STK-MED ONCE IV ;  Start 9/30/19 at 10:52;  Stop 

9/30/19 at 10:53;  Status DC


Lidocaine HCl (Lidocaine Pf 2% Vial) 5 ml STK-MED ONCE .ROUTE ;  Start 9/30/19 

at 10:52;  Stop 9/30/19 at 10:53;  Status DC


Dexamethasone Sodium Phosphate (Decadron) 4 mg STK-MED ONCE .ROUTE ;  Start 

9/30/19 at 10:52;  Stop 9/30/19 at 10:53;  Status DC


Fentanyl Citrate (Fentanyl 2ml Vial) 100 mcg STK-MED ONCE .ROUTE ;  Start 

9/30/19 at 10:52;  Stop 9/30/19 at 10:53;  Status DC


Sevoflurane (Ultane) 15 ml STK-MED ONCE IH ;  Start 9/30/19 at 11:58;  Stop 

9/30/19 at 11:59;  Status DC


Lactobacillus Rhamnosus (Culturelle) 1 cap BID PO  Last administered on 

9/30/19at 20:28;  Start 9/30/19 at 21:00


Throat Lozenges (Cepacol Sore Throat Lozenge) 1 eli PRN Q2HRS  PRN PO SORE 

THROAT Last administered on 9/30/19at 22:12;  Start 9/30/19 at 18:00





Active Scripts


Active


Keflex (Cephalexin) 500 Mg Capsule 1 Cap PO TID


Triamcinolone Acetonide 0.1% Oint (Triamcinolone Acetonide) 15 Gm Oint...g. 1 

Roxy TP TID


     MIX WITH EUCERIN


     AS DIRECTED BY PHYSICIAN


Doxycycline Hyclate 100 Mg Tablet 100 Mg PO BID 5 Days


Prednisone 20 Mg Tablet 20 Mg PO DAILY 5 Days


Vitals/I & O





Vital Sign - Last 24 Hours








 9/30/19 9/30/19 9/30/19 9/30/19





 10:53 11:00 12:08 12:22


 


Temp 97.2 97.8 98.6 





 97.2 97.8 98.6 


 


Pulse 99 92 91 


 


Resp 20 17 16 16


 


B/P (MAP) 140/76 125/69 (87) 120/65 


 


Pulse Ox 100 97 100 99


 


O2 Delivery Room Air Room Air Simple Mask Room Air


 


O2 Flow Rate   8 


 


    





    





 9/30/19 9/30/19 9/30/19 9/30/19





 12:25 12:38 12:40 13:00


 


Temp    97.9





    97.9


 


Pulse 82  84 86


 


Resp 20 16 16 18


 


B/P (MAP) 137/67  131/70 131/71 (91)


 


Pulse Ox 95 98 98 96


 


O2 Delivery Room Air Room Air Room Air Room Air


 


    





    





 9/30/19 9/30/19 9/30/19 9/30/19





 13:15 13:30 13:45 14:15


 


Pulse 81 79 78 86


 


Resp 18 18 18 18


 


B/P (MAP) 131/68 (89) 129/69 (89) 142/73 (96) 122/63 (82)


 


Pulse Ox 97 98 97 96


 


O2 Delivery Room Air Room Air Room Air Room Air





 9/30/19 9/30/19 9/30/19 10/1/19





 14:45 19:00 23:00 01:26


 


Temp 97.8 98.1 98.1 





 97.8 98.1 98.1 


 


Pulse 73 78 72 


 


Resp 18 18 18 15


 


B/P (MAP) 114/63 (80) 125/68 (87) 108/54 (72) 


 


Pulse Ox 96 96 94 94


 


O2 Delivery Room Air Room Air Room Air Room Air


 


    





    





 10/1/19 10/1/19 10/1/19 





 02:26 03:00 07:00 


 


Temp  97.9 97.4 





  97.9 97.4 


 


Pulse  66 80 


 


Resp 14 18 18 


 


B/P (MAP)  101/62 (75) 112/68 (83) 


 


Pulse Ox 94 97 99 


 


O2 Delivery Room Air Room Air Room Air 














Intake and Output   


 


 9/30/19 9/30/19 10/1/19





 15:00 23:00 07:00


 


Intake Total 1070 ml 350 ml 


 


Output Total 10 ml 1300 ml 


 


Balance 1060 ml -950 ml 

















JIMMY NOVA MD         Oct 1, 2019 08:40

## 2019-10-01 NOTE — PDOC
ORTHO PROGRESS NOTES


Subjective


Patient with no complaint of pain this morning and says doing well.


Post-op Day:  1


Procedure


I&D Right Leg abscess


Vitals





Vital Signs








  Date Time  Temp Pulse Resp B/P (MAP) Pulse Ox O2 Delivery O2 Flow Rate FiO2


 


10/1/19 07:00 97.4 80 18 112/68 (83) 99 Room Air  





 97.4       


 


9/30/19 12:08       8 








Labs





Laboratory Tests








Test


 9/29/19


17:00 9/30/19


05:30 9/30/19


10:00


 


White Blood Count


 10.0 x10^3/uL


(4.0-11.0) 


 





 


Red Blood Count


 3.86 x10^6/uL


(3.50-5.40) 


 





 


Hemoglobin


 12.0 g/dL


(12.0-15.5) 


 





 


Hematocrit


 34.6 %


(36.0-47.0) 


 





 


Mean Corpuscular Volume 90 fL ()   


 


Mean Corpuscular Hemoglobin 31 pg (25-35)   


 


Mean Corpuscular Hemoglobin


Concent 35 g/dL


(31-37) 


 





 


Red Cell Distribution Width


 13.5 %


(11.5-14.5) 


 





 


Platelet Count


 263 x10^3/uL


(140-400) 


 





 


Neutrophils (%) (Auto) 73 % (31-73)   


 


Lymphocytes (%) (Auto) 21 % (24-48)   


 


Monocytes (%) (Auto) 5 % (0-9)   


 


Eosinophils (%) (Auto) 1 % (0-3)   


 


Basophils (%) (Auto) 0 % (0-3)   


 


Neutrophils # (Auto)


 7.3 x10^3/uL


(1.8-7.7) 


 





 


Lymphocytes # (Auto)


 2.1 x10^3/uL


(1.0-4.8) 


 





 


Monocytes # (Auto)


 0.5 x10^3/uL


(0.0-1.1) 


 





 


Eosinophils # (Auto)


 0.1 x10^3/uL


(0.0-0.7) 


 





 


Basophils # (Auto)


 0.0 x10^3/uL


(0.0-0.2) 


 





 


Erythrocyte Sedimentation Rate 66 (0-25)   


 


Sodium Level


 139 mmol/L


(136-145) 141 mmol/L


(136-145) 





 


Potassium Level


 3.3 mmol/L


(3.5-5.1) 4.0 mmol/L


(3.5-5.1) 





 


Chloride Level


 101 mmol/L


() 104 mmol/L


() 





 


Carbon Dioxide Level


 29 mmol/L


(21-32) 28 mmol/L


(21-32) 





 


Anion Gap 9 (6-14)  9 (6-14)  


 


Blood Urea Nitrogen 6 mg/dL (7-20)  4 mg/dL (7-20)  


 


Creatinine


 0.9 mg/dL


(0.6-1.0) 0.7 mg/dL


(0.6-1.0) 





 


Estimated GFR


(Cockcroft-Gault) 83.5 


 111.6 


 





 


BUN/Creatinine Ratio 7 (6-20)   


 


Glucose Level


 99 mg/dL


(70-99) 100 mg/dL


(70-99) 





 


Lactic Acid Level


 0.9 mmol/L


(0.4-2.0) 


 





 


Calcium Level


 9.6 mg/dL


(8.5-10.1) 9.0 mg/dL


(8.5-10.1) 





 


Total Bilirubin


 0.4 mg/dL


(0.2-1.0) 


 





 


Aspartate Amino Transf


(AST/SGOT) 13 U/L (15-37) 


 


 





 


Alanine Aminotransferase


(ALT/SGPT) 14 U/L (14-59) 


 


 





 


Alkaline Phosphatase


 73 U/L


() 


 





 


Total Protein


 8.4 g/dL


(6.4-8.2) 


 





 


Albumin


 3.6 g/dL


(3.4-5.0) 


 





 


Albumin/Globulin Ratio 0.8 (1.0-1.7)   


 


Urine Pregnancy Test   Negative (NEG) 








Laboratory Tests








Test


 9/30/19


10:00


 


Urine Pregnancy Test Negative (NEG) 








Notes


awake and alert sitting up in bed.


Assessment and Plan


POD # 1 I&D right leg abscess


motor and sensation intact distally


dressing dry and intact


moving R LE on request


continue antibiotics











JUAN BUCHANAN             Oct 1, 2019 09:55

## 2019-10-01 NOTE — NUR
Wound care:



Patient seen per wound care consult. See wound assessment. Patient is s/p I&D today on 
10/1/19. The open incision was cleansed, assessed, measured, and pictured. Orders to pack 
with Iodoform packing daily. Wound packed and covered with bandage, patient tolerated well. 
Patient stated she is interested in following up in the wound clinic after discharge. 
Dressing change instructions left in room. No other wounds noted at this time. Patient up in 
chair, call light in reach. Will follow patient regarding wound care.

## 2019-10-02 VITALS — SYSTOLIC BLOOD PRESSURE: 115 MMHG | DIASTOLIC BLOOD PRESSURE: 58 MMHG

## 2019-10-02 VITALS — DIASTOLIC BLOOD PRESSURE: 48 MMHG | SYSTOLIC BLOOD PRESSURE: 129 MMHG

## 2019-10-02 VITALS — DIASTOLIC BLOOD PRESSURE: 55 MMHG | SYSTOLIC BLOOD PRESSURE: 105 MMHG

## 2019-10-02 VITALS — SYSTOLIC BLOOD PRESSURE: 97 MMHG | DIASTOLIC BLOOD PRESSURE: 39 MMHG

## 2019-10-02 RX ADMIN — ACETAMINOPHEN PRN MG: 500 TABLET ORAL at 16:43

## 2019-10-02 RX ADMIN — CLINDAMYCIN IN 5 PERCENT DEXTROSE SCH MLS/HR: 12 INJECTION, SOLUTION INTRAVENOUS at 05:15

## 2019-10-02 RX ADMIN — Medication SCH CAP: at 09:31

## 2019-10-02 RX ADMIN — ACETAMINOPHEN PRN MG: 500 TABLET ORAL at 04:17

## 2019-10-02 RX ADMIN — CLINDAMYCIN IN 5 PERCENT DEXTROSE SCH MLS/HR: 12 INJECTION, SOLUTION INTRAVENOUS at 14:21

## 2019-10-02 NOTE — PDOC
PROGRESS NOTES


Chief Complaint


Chief Complaint


Left shin abscess sparing the joint


Morbid obesity


Eczema - bilateral legs, feet, plantar and palmar surfaces





History of Present Illness


History of Present Illness


Ms Metcalf is a 38yo F w/ PMHx Obesity, eczema who was admitted for cellulitis

and abscess of right anterior tibia.


She feels she had a spider bite last week, went to ER, given keflex and pain med

but no better, NOW more red, swollen painful and possible fluctuance with 

induration around, NON DM,. no fevers, at home, WBC 10. PCN and sulfa gives her 

hives and breathing issues, respectively. Admitted for cellulitis with possible 

abscess that failed OP Abx. Tetanus shot unrecalled. US right tibia - Complex 

heterogeneous collection within the anterior right mid tibial level soft tissues

which raises question of phlegmon. 





9/30/19: To OR for I&D with purulent fluid removed. Wound open





She states her pain is reasonably controlled. Improved after surgery yesterday. 

She had BM, passing flatus. Good appetite. No SOB or CP. Gram negative rods on c

ultures





Vitals


Vitals





Vital Signs








  Date Time  Temp Pulse Resp B/P (MAP) Pulse Ox O2 Delivery O2 Flow Rate FiO2


 


10/2/19 07:15 98.0 67 18 105/55 (72) 98 Room Air  





 98.0       











Physical Exam


General:  Alert, Oriented X3, Cooperative, mild distress


Heart:  Regular rate, Normal S1, Normal S2


Lungs:  Clear


Abdomen:  Normal bowel sounds, Soft, No tenderness, No hepatosplenomegaly, No 

masses


Extremities:  Normal pulses


Skin:  Other (redenss with possible small fluctuiance but definite induration 

around the bite site,warm, tender to touch)





Assessment and Plan


Assessmemt and Plan


Problems


Medical Problems:


(1) Cellulitis


Status: Acute  











Comment


Review of Relevant


I have reviewed the following items eleazar (where applicable) has been applied.


Labs





Laboratory Tests








Test


 9/30/19


10:00


 


Urine Pregnancy Test Negative (NEG) 








Microbiology


9/30/19 Anaerobic/Aerobic Culture, Resulted


          Pending


9/30/19 Anaerobic Culture Result 1 (XENA), Resulted


          Pending


9/30/19 Aerobic Culture, Resulted


          Pending


9/30/19 Aerobic Culture Result 1 (XENA), Resulted


          Pending


9/30/19 Gram Stain - Final, Resulted


          


9/30/19 Gram Stain Result 1 (XENA) - Final, Resulted


          


9/30/19 Gram Stain Result 2 (XENA) - Final, Resulted


          


9/29/19 Blood Culture - Preliminary, Resulted


          NO GROWTH AFTER 2 DAYS


Medications





Current Medications


Potassium Chloride (Klor-Con) 40 meq 1X  ONCE PO  Last administered on 9/29/19at

18:19;  Start 9/29/19 at 18:00;  Stop 9/29/19 at 18:01;  Status DC


Acetaminophen/ Hydrocodone Bitart (Lortab 5/325) 1 tab PRN Q4HRS  PRN PO PAIN 

Last administered on 10/1/19at 01:26;  Start 9/29/19 at 18:00


Zolpidem Tartrate (Ambien) 5 mg PRN QHS  PRN PO INSOMNIA;  Start 9/29/19 at 

18:00


Fentanyl Citrate (Fentanyl 2ml Vial) 50 mcg PRN Q2HR  PRN IV PAIN Last 

administered on 9/30/19at 12:38;  Start 9/29/19 at 18:00


Clindamycin Phosphate 50 ml @  100 mls/hr Q8HRS IV  Last administered on 

10/2/19at 05:15;  Start 9/30/19 at 06:00


Celecoxib (CeleBREX) 100 mg BID PO ;  Start 9/30/19 at 09:00;  Stop 9/29/19 at 

19:05;  Status DC


Triamcinolone Acetonide (Kenalog 0.1%) 1 roxy PRN TID  PRN TP INFLAMMED AREA;  

Start 9/29/19 at 21:00


Acetaminophen (Tylenol) 500 mg PRN Q6HRS  PRN PO HEADACHE/ TEMP Last 

administered on 10/2/19at 04:17;  Start 9/29/19 at 18:00


Ondansetron HCl (Zofran) 4 mg PRN Q6HRS  PRN IVP NAUSEA/VOMITING;  Start 9/29/19

at 18:00


Clindamycin Phosphate 50 ml @  100 mls/hr ONCE  ONCE IV  Last administered on 

9/29/19at 18:18;  Start 9/29/19 at 18:00;  Stop 9/29/19 at 18:29;  Status DC


Tetanus/ Diphtheria Toxoids (Tenivac Syringe) 0.5 ml ONCE ONCE VAX IM  Last 

administered on 9/29/19at 23:06;  Start 9/29/19 at 19:15;  Stop 9/29/19 at 

19:16;  Status DC


Ondansetron HCl (Zofran) 4 mg PRN Q6HRS  PRN IV NAUSEA/VOMITING;  Start 9/30/19 

at 09:45;  Stop 10/1/19 at 09:44;  Status DC


Fentanyl Citrate (Fentanyl 2ml Vial) 25 mcg PRN Q5MIN  PRN IV MILD PAIN 1-3;  St

art 9/30/19 at 09:45;  Stop 10/1/19 at 09:44;  Status DC


Fentanyl Citrate (Fentanyl 2ml Vial) 50 mcg PRN Q5MIN  PRN IV MODERATE TO SEVERE

PAIN;  Start 9/30/19 at 09:45;  Stop 10/1/19 at 09:44;  Status DC


Morphine Sulfate (Morphine Sulfate) 1 mg PRN Q10MIN  PRN IV SEVERE PAIN 7-10;  

Start 9/30/19 at 09:45;  Stop 10/1/19 at 09:44;  Status DC


Ringer's Solution 1,000 ml @  30 mls/hr Q24H IV  Last administered on 9/30/19at 

10:46;  Start 9/30/19 at 09:33;  Stop 9/30/19 at 21:32;  Status DC


Lidocaine HCl (Xylocaine-Mpf 1% 2ml Vial) 2 ml PRN 1X  PRN ID PRIOR TO IV START;

 Start 9/30/19 at 09:45;  Stop 10/1/19 at 09:44;  Status DC


Hydromorphone HCl (Dilaudid) 0.5 mg PRN Q10MIN  PRN IV SEV PAIN, Second choice; 

Start 9/30/19 at 09:45;  Stop 10/1/19 at 09:44;  Status DC


Prochlorperazine Edisylate (Compazine) 5 mg PACU PRN  PRN IV NAUSEA, MRX1;  

Start 9/30/19 at 09:45;  Stop 10/1/19 at 09:44;  Status DC


Ondansetron HCl (Zofran) 4 mg STK-MED ONCE .ROUTE ;  Start 9/30/19 at 10:52;  

Stop 9/30/19 at 10:52;  Status DC


Propofol 20 ml @ As Directed STK-MED ONCE IV ;  Start 9/30/19 at 10:52;  Stop 

9/30/19 at 10:53;  Status DC


Lidocaine HCl (Lidocaine Pf 2% Vial) 5 ml STK-MED ONCE .ROUTE ;  Start 9/30/19 

at 10:52;  Stop 9/30/19 at 10:53;  Status DC


Dexamethasone Sodium Phosphate (Decadron) 4 mg STK-MED ONCE .ROUTE ;  Start 

9/30/19 at 10:52;  Stop 9/30/19 at 10:53;  Status DC


Fentanyl Citrate (Fentanyl 2ml Vial) 100 mcg STK-MED ONCE .ROUTE ;  Start 

9/30/19 at 10:52;  Stop 9/30/19 at 10:53;  Status DC


Sevoflurane (Ultane) 15 ml STK-MED ONCE IH ;  Start 9/30/19 at 11:58;  Stop 

9/30/19 at 11:59;  Status DC


Lactobacillus Rhamnosus (Culturelle) 1 cap BID PO  Last administered on 

10/1/19at 21:21;  Start 9/30/19 at 21:00


Throat Lozenges (Cepacol Sore Throat Lozenge) 1 eli PRN Q2HRS  PRN PO SORE 

THROAT Last administered on 9/30/19at 22:12;  Start 9/30/19 at 18:00





Active Scripts


Active


Keflex (Cephalexin) 500 Mg Capsule 1 Cap PO TID


Triamcinolone Acetonide 0.1% Oint (Triamcinolone Acetonide) 15 Gm Oint...g. 1 

Roxy TP TID


     MIX WITH EUCERIN


     AS DIRECTED BY PHYSICIAN


Doxycycline Hyclate 100 Mg Tablet 100 Mg PO BID 5 Days


Prednisone 20 Mg Tablet 20 Mg PO DAILY 5 Days


Vitals/I & O





Vital Sign - Last 24 Hours








 10/1/19 10/1/19 10/1/19 10/1/19





 11:00 15:00 19:00 20:00


 


Temp 97.8 98.0 97.8 





 97.8 98.0 97.8 


 


Pulse 86 69 84 


 


Resp 18 18 18 


 


B/P (MAP) 104/46 (65) 121/44 (69) 102/45 (64) 


 


Pulse Ox 98 99 97 


 


O2 Delivery Room Air Room Air Room Air Room Air


 


    





    





 10/1/19 10/2/19 10/2/19 





 23:00 03:00 07:15 


 


Temp 97.9 97.9 98.0 





 97.9 97.9 98.0 


 


Pulse 79 67 67 


 


Resp 18 18 18 


 


B/P (MAP) 107/53 (71) 97/39 (58) 105/55 (72) 


 


Pulse Ox 97 97 98 


 


O2 Delivery Room Air Room Air Room Air 














Intake and Output   


 


 10/1/19 10/1/19 10/2/19





 15:00 23:00 07:00


 


Intake Total  50 ml 


 


Balance  50 ml 

















JIMMY NOVA MD         Oct 2, 2019 08:07

## 2019-10-02 NOTE — PDOC3
Discharge Summary


Visit Information


Date of Admission:  Sep 29, 2019


Date of Discharge:  Oct 2, 2019


Admitting Diagnosis:  RLE cellulitis and abscess


Final Diagnosis


Problems


Medical Problems:


(1) Cellulitis


Status: Acute  











Brief Hospital Course


Allergies





                                    Allergies








Coded Allergies Type Severity Reaction Last Updated Verified


 


  Sulfa (Sulfonamide Antibiotics) Allergy Severe Shortness of Air 9/30/19 Yes


 


  Penicillins Allergy Intermediate hives, itching 9/30/19 Yes








Vital Signs





Vital Signs








  Date Time  Temp Pulse Resp B/P (MAP) Pulse Ox O2 Delivery O2 Flow Rate FiO2


 


10/2/19 11:16 98.4 82 20 115/58 (77) 99 Room Air  





 98.4       


 


10/2/19 08:00       2.0 








Brief Hospital Course


Ms Metcalf is a 38yo F w/ PMHx Obesity, eczema who was admitted for cellulitis

and abscess of right anterior tibia.


She feels she had a spider bite last week, went to ER, given keflex and pain med

but no better, NOW more red, swollen painful and possible fluctuance with 

induration around, NON DM,. no fevers, at home, WBC 10. PCN and sulfa gives her 

hives and breathing issues, respectively. Admitted for cellulitis with possible 

abscess that failed OP Abx. Tetanus shot unrecalled. US right tibia - Complex 

heterogeneous collection within the anterior right mid tibial level soft tissues

which raises question of phlegmon. 





9/30/19: To OR for I&D with purulent fluid removed. Wound open





She states her pain is reasonably controlled. Improved after surgery yesterday. 

She had BM, passing flatus. Good appetite. No SOB or CP. Gram negative rods on 

cultures. Improved on cipro and clindamycin, sent home with these prescriptions.





Assessment/plan


Left shin cellulitis and abscess sparing the joint - f/u outpatient wound 

clinic, complete 10 day course of antibiotics


Morbid obesity


Eczema - bilateral legs, feet, plantar and palmar surfaces





Greater than 30 minutes spent on discharge.





Discharge Information


Condition at Discharge:  Improved


Follow Up:  Weeks (1)


Disposition/Orders:  D/C to Home


Scheduled


Ciprofloxacin Hcl (Cipro) 500 Mg Tablet, 1 TAB PO BID for Cellulitis for 10 

Days, #20


   Prescribed by: JIMMY NOVA MD on 10/2/19 1301


Clindamycin Hcl (Clindamycin Hcl) 300 Mg Capsule, 1 CAP PO TID for cellulitis 

for 10 Days, #30


   Prescribed by: JIMMY NOVA MD on 10/2/19 1302


Lactobacillus Rhamnosus Gg (Culturelle) 1 Each Cap.sprink, 1 CAP PO BID for 

Cellulitis for 10 Days, #20


   Prescribed by: JIMMY NOVA MD on 10/2/19 1301


Mometasone Furoate (Elocon) 15 Gm Cream..g., 1 NATALYA TP BID for Eczema for 30 

Days, #45


   Prescribed by: JIMMY NOVA MD on 10/2/19 1301





Discontinued Medications


Cephalexin (Keflex) 500 Mg Capsule, 1 CAP PO TID, #21


   Prescribed by: JETT SHABAZZ on 2/24/19 1230


   Last Action: HELD on 9/29/19 1756 by CRISTIANO ARNETT


Doxycycline Hyclate (Doxycycline Hyclate) 100 Mg Tablet, 100 MG PO BID for 5 

Days


   Prescribed by: CAREY FLORES on 6/1/16 1945


   Last Action: HELD on 9/29/19 1756 by CRISTIANO ARNETT


Prednisone (Prednisone) 20 Mg Tablet, 20 MG PO DAILY for 5 Days


   Prescribed by: CAREY FLORES on 6/1/16 1945


   Last Action: HELD on 9/29/19 1756 by CRISTIANO ARNETT


Triamcinolone Acetonide (Triamcinolone Acetonide 0.1% Oint) 15 Gm Oint...g., 1 

NATALYA TP TID for WOUND CARE, #1


   MIX WITH EUCERIN AS DIRECTED BY PHYSICIAN 


   Prescribed by: JETT SHABAZZ on 2/24/19 1230


   Last Action: Continued on 9/29/19 1756 by JIMMY LICONA MD         Oct 2, 2019 13:10

## 2023-04-12 NOTE — PDOC1
History and Physical


Date of Admission


Date of Admission


DATE: 9/29/19 


TIME: 19:04





Identification/Chief Complaint


Chief Complaint


spider bite rt shin





Source


Source:  Caregiver, Chart review, Patient





History of Present Illness


History of Present Illness


spider bite last week, went to ER, given keflex and pain med but no better, NOW 

more red, swollen painful and possible fluctuance with induration around, NON 

DM,. no fevers, at home, WBC 10. PCN and sulfa gives her hives and breathing 

sisues, respectively. Admitted for cellulitis with possible abscess that failed 

OP Abx





Tetanus shot unrecalled


NO home meds to reconcile





Past Medical History


Cardiovascular:  No pertinent hx


Pulmonary:  No pertinent hx


GI:  No pertinent hx


Heme/Onc:  No pertinent hx


Hepatobiliary:  No pertinent hx


Psych:  No pertinent hx


Rheumatologic:  No pertinent hx


Infectious disease:  No pertinent hx


ENT:  No pertinent hx


Renal/:  No pertinent hx





Past Surgical History


Past Surgical History:  No pertinent history





Family History


Family History:  No Significant





Social History


Smoke:  No


ALCOHOL:  occassional


Drugs:  None





Current Problem List


Problem List


Problems


Medical Problems:


(1) Cellulitis


Status: Acute  











Current Medications


Current Medications





Current Medications


Potassium Chloride (Klor-Con) 40 meq 1X  ONCE PO  Last administered on 9/29/19at

18:19;  Start 9/29/19 at 18:00;  Stop 9/29/19 at 18:01;  Status DC


Acetaminophen/ Hydrocodone Bitart (Lortab 5/325) 1 tab PRN Q4HRS  PRN PO PAIN;  

Start 9/29/19 at 18:00


Zolpidem Tartrate (Ambien) 5 mg PRN QHS  PRN PO INSOMNIA;  Start 9/29/19 at 

18:00


Fentanyl Citrate (Fentanyl 2ml Vial) 50 mcg PRN Q2HR  PRN IV PAIN;  Start 

9/29/19 at 18:00


Clindamycin Phosphate 50 ml @  100 mls/hr Q8HRS IV ;  Start 9/30/19 at 06:00


Celecoxib (CeleBREX) 100 mg BID PO ;  Start 9/30/19 at 09:00


Triamcinolone Acetonide (Kenalog 0.1%) 1 roxy PRN TID  PRN TP INFLAMMED AREA;  

Start 9/29/19 at 21:00


Acetaminophen (Tylenol) 500 mg PRN Q6HRS  PRN PO HEADACHE/ TEMP;  Start 9/29/19 

at 18:00


Ondansetron HCl (Zofran) 4 mg PRN Q6HRS  PRN IVP NAUSEA/VOMITING;  Start 9/29/19

at 18:00


Clindamycin Phosphate 50 ml @  100 mls/hr ONCE  ONCE IV  Last administered on 

9/29/19at 18:18;  Start 9/29/19 at 18:00;  Stop 9/29/19 at 18:29;  Status DC





Active Scripts


Active


Keflex (Cephalexin) 500 Mg Capsule 1 Cap PO TID


Triamcinolone Acetonide 0.1% Oint (Triamcinolone Acetonide) 15 Gm Oint...g. 1 

Roxy TP TID


     MIX WITH EUCERIN


     AS DIRECTED BY PHYSICIAN


Doxycycline Hyclate 100 Mg Tablet 100 Mg PO BID 5 Days


Prednisone 20 Mg Tablet 20 Mg PO DAILY 5 Days





Allergies


Allergies:  


Coded Allergies:  


     Penicillins (Verified  Allergy, Intermediate, hives, itching, 2/24/19)


     Sulfa (Sulfonamide Antibiotics) (Verified  Allergy, Unknown, 4/18/14)





ROS


Review of System


pain anterior rt shin, all else 14 pt neg





Physical Exam


General:  Alert, Oriented X3, Cooperative, No acute distress


HEENT:  Atraumatic, PERRLA


Lungs:  Clear to auscultation, Normal air movement


Heart:  S1S2, RRR, no thrills, no rubs, no gallops, no murmurs


Cardiovascular:  S1, S2


Breasts:  Normal, Rt breast nml w/o mass, Lt breast nml w/o mass, Nipples normal


Abdomen:  Normal bowel sounds, Soft, No tenderness, No hepatosplenomegaly, No 

masses


PELVIC:  Nml ext genitalia


Skin:  Other (redenss with possible small fluctuiance but definite induration 

around the bite site,warm, tender to touch)


Neuro:  Normal gait, Normal speech, Strength at 5/5 X4 ext, Normal tone, 

Sensation intact, Cranial nerves 3-12 NL, Reflexes 2+


Psych/Mental Status:  Mental status NL, Mood NL





Vitals


Vitals





Vital Signs








  Date Time  Temp Pulse Resp B/P (MAP) Pulse Ox O2 Delivery O2 Flow Rate FiO2


 


9/29/19 15:55 98.5 90 14 138/66 (90) 99 Room Air  





 98.5       











Labs


Labs





Laboratory Tests








Test


 9/29/19


17:00


 


White Blood Count


 10.0 x10^3/uL


(4.0-11.0)


 


Red Blood Count


 3.86 x10^6/uL


(3.50-5.40)


 


Hemoglobin


 12.0 g/dL


(12.0-15.5)


 


Hematocrit


 34.6 %


(36.0-47.0)


 


Mean Corpuscular Volume 90 fL () 


 


Mean Corpuscular Hemoglobin 31 pg (25-35) 


 


Mean Corpuscular Hemoglobin


Concent 35 g/dL


(31-37)


 


Red Cell Distribution Width


 13.5 %


(11.5-14.5)


 


Platelet Count


 263 x10^3/uL


(140-400)


 


Neutrophils (%) (Auto) 73 % (31-73) 


 


Lymphocytes (%) (Auto) 21 % (24-48) 


 


Monocytes (%) (Auto) 5 % (0-9) 


 


Eosinophils (%) (Auto) 1 % (0-3) 


 


Basophils (%) (Auto) 0 % (0-3) 


 


Neutrophils # (Auto)


 7.3 x10^3/uL


(1.8-7.7)


 


Lymphocytes # (Auto)


 2.1 x10^3/uL


(1.0-4.8)


 


Monocytes # (Auto)


 0.5 x10^3/uL


(0.0-1.1)


 


Eosinophils # (Auto)


 0.1 x10^3/uL


(0.0-0.7)


 


Basophils # (Auto)


 0.0 x10^3/uL


(0.0-0.2)


 


Sodium Level


 139 mmol/L


(136-145)


 


Potassium Level


 3.3 mmol/L


(3.5-5.1)


 


Chloride Level


 101 mmol/L


()


 


Carbon Dioxide Level


 29 mmol/L


(21-32)


 


Anion Gap 9 (6-14) 


 


Blood Urea Nitrogen 6 mg/dL (7-20) 


 


Creatinine


 0.9 mg/dL


(0.6-1.0)


 


Estimated GFR


(Cockcroft-Gault) 83.5 





 


BUN/Creatinine Ratio 7 (6-20) 


 


Glucose Level


 99 mg/dL


(70-99)


 


Lactic Acid Level


 0.9 mmol/L


(0.4-2.0)


 


Calcium Level


 9.6 mg/dL


(8.5-10.1)


 


Total Bilirubin


 0.4 mg/dL


(0.2-1.0)


 


Aspartate Amino Transf


(AST/SGOT) 13 U/L (15-37) 





 


Alanine Aminotransferase


(ALT/SGPT) 14 U/L (14-59) 





 


Alkaline Phosphatase


 73 U/L


()


 


Total Protein


 8.4 g/dL


(6.4-8.2)


 


Albumin


 3.6 g/dL


(3.4-5.0)


 


Albumin/Globulin Ratio 0.8 (1.0-1.7) 








Laboratory Tests








Test


 9/29/19


17:00


 


White Blood Count


 10.0 x10^3/uL


(4.0-11.0)


 


Red Blood Count


 3.86 x10^6/uL


(3.50-5.40)


 


Hemoglobin


 12.0 g/dL


(12.0-15.5)


 


Hematocrit


 34.6 %


(36.0-47.0)


 


Mean Corpuscular Volume 90 fL () 


 


Mean Corpuscular Hemoglobin 31 pg (25-35) 


 


Mean Corpuscular Hemoglobin


Concent 35 g/dL


(31-37)


 


Red Cell Distribution Width


 13.5 %


(11.5-14.5)


 


Platelet Count


 263 x10^3/uL


(140-400)


 


Neutrophils (%) (Auto) 73 % (31-73) 


 


Lymphocytes (%) (Auto) 21 % (24-48) 


 


Monocytes (%) (Auto) 5 % (0-9) 


 


Eosinophils (%) (Auto) 1 % (0-3) 


 


Basophils (%) (Auto) 0 % (0-3) 


 


Neutrophils # (Auto)


 7.3 x10^3/uL


(1.8-7.7)


 


Lymphocytes # (Auto)


 2.1 x10^3/uL


(1.0-4.8)


 


Monocytes # (Auto)


 0.5 x10^3/uL


(0.0-1.1)


 


Eosinophils # (Auto)


 0.1 x10^3/uL


(0.0-0.7)


 


Basophils # (Auto)


 0.0 x10^3/uL


(0.0-0.2)


 


Sodium Level


 139 mmol/L


(136-145)


 


Potassium Level


 3.3 mmol/L


(3.5-5.1)


 


Chloride Level


 101 mmol/L


()


 


Carbon Dioxide Level


 29 mmol/L


(21-32)


 


Anion Gap 9 (6-14) 


 


Blood Urea Nitrogen 6 mg/dL (7-20) 


 


Creatinine


 0.9 mg/dL


(0.6-1.0)


 


Estimated GFR


(Cockcroft-Gault) 83.5 





 


BUN/Creatinine Ratio 7 (6-20) 


 


Glucose Level


 99 mg/dL


(70-99)


 


Lactic Acid Level


 0.9 mmol/L


(0.4-2.0)


 


Calcium Level


 9.6 mg/dL


(8.5-10.1)


 


Total Bilirubin


 0.4 mg/dL


(0.2-1.0)


 


Aspartate Amino Transf


(AST/SGOT) 13 U/L (15-37) 





 


Alanine Aminotransferase


(ALT/SGPT) 14 U/L (14-59) 





 


Alkaline Phosphatase


 73 U/L


()


 


Total Protein


 8.4 g/dL


(6.4-8.2)


 


Albumin


 3.6 g/dL


(3.4-5.0)


 


Albumin/Globulin Ratio 0.8 (1.0-1.7) 











VTE Prophylaxis Ordered


VTE Prophylaxis Devices:  Yes


VTE Pharmacological Prophylaxi:  Yes





Assessment/Plan


Assessment/Plan


cellulitis r.o abscess sec to spider bite, failed oP tx


NO DM


UNrecalled tetanus shot


SIRS


Mild hypokalemia 3.3


Allergy to sulfa and PCN





PLAN:


MEd surg, 2 MN


IV clinda


Chjeck ESR


Check sono for abscess


I think it would warrant an ortho eval, make sure nothing to drain therefore reg

diet tonyt but npo post MN in case


Replace K orally , may recheck tmr





dw pt,s een at ER


FULL CODE 


no home meds to reconcile











CRISTIANO ARNETT MD           Sep 29, 2019 19:10 DVT ppx: hep subc    Offered to update wife, but patient declined. Prefers to update himself.